# Patient Record
Sex: FEMALE | Race: WHITE | NOT HISPANIC OR LATINO | URBAN - METROPOLITAN AREA
[De-identification: names, ages, dates, MRNs, and addresses within clinical notes are randomized per-mention and may not be internally consistent; named-entity substitution may affect disease eponyms.]

---

## 2023-10-30 ENCOUNTER — LAB OUTSIDE AN ENCOUNTER (OUTPATIENT)
Dept: URBAN - METROPOLITAN AREA CLINIC 118 | Facility: CLINIC | Age: 58
End: 2023-10-30

## 2023-10-30 ENCOUNTER — OFFICE VISIT (OUTPATIENT)
Dept: URBAN - METROPOLITAN AREA CLINIC 118 | Facility: CLINIC | Age: 58
End: 2023-10-30
Payer: COMMERCIAL

## 2023-10-30 VITALS
SYSTOLIC BLOOD PRESSURE: 146 MMHG | DIASTOLIC BLOOD PRESSURE: 77 MMHG | HEIGHT: 62 IN | WEIGHT: 157.8 LBS | BODY MASS INDEX: 29.04 KG/M2 | TEMPERATURE: 97.7 F | HEART RATE: 89 BPM

## 2023-10-30 DIAGNOSIS — R93.2 ABNORMAL LIVER CT: ICD-10-CM

## 2023-10-30 PROBLEM — 15634181000119107: Status: ACTIVE | Noted: 2023-10-30

## 2023-10-30 PROCEDURE — 99204 OFFICE O/P NEW MOD 45 MIN: CPT | Performed by: STUDENT IN AN ORGANIZED HEALTH CARE EDUCATION/TRAINING PROGRAM

## 2023-10-30 NOTE — HPI-TODAY'S VISIT:
The patient is a 57-year-old female with history of cholecystectomy and recent appendectomy who presents for evaluation of abnormal liver imaging.  The patient had an appendectomy on 10/11/2023.  During her hospitalization, the patient reportedly had a CT abdomen and pelvis which revealed a liver abnormality.  She was told to follow-up with a gastroenterologist and that she might need further abdominal imaging.  The patient has been recovering well since discharge from the hospital from her surgery.  Prior to her appendectomy, the patient denies being told that she had any liver issues.  She does note extensive alcohol use for the past 20 years.  She states that she has cut back significantly, and at this point has a few drinks on the weekend, with rarely any drinks during the week.  She says she smokes 1 pack every few days but smoked a pack a day in the past (has been smoking for the past 30 years).  The patient has never had colorectal cancer screening and denies family history of GI or liver cancers or diseases.

## 2023-10-31 LAB
A/G RATIO: 1.4
ALBUMIN: 4.3
ALKALINE PHOSPHATASE: 114
ALT (SGPT): 45
AST (SGOT): 32
BILIRUBIN, TOTAL: 0.4
BUN/CREATININE RATIO: 23
BUN: 24
CALCIUM: 10.1
CARBON DIOXIDE, TOTAL: 31
CHLORIDE: 94
CREATININE: 1.04
EGFR: 62
GLOBULIN, TOTAL: 3
GLUCOSE: 168
POTASSIUM: 4
PROTEIN, TOTAL: 7.3
SODIUM: 137

## 2023-11-13 ENCOUNTER — OFFICE VISIT (OUTPATIENT)
Dept: URBAN - METROPOLITAN AREA CLINIC 117 | Facility: CLINIC | Age: 58
End: 2023-11-13
Payer: COMMERCIAL

## 2023-11-13 DIAGNOSIS — K76.0 FATTY LIVER: ICD-10-CM

## 2023-11-13 DIAGNOSIS — R93.2 ABNORMAL LIVER CT: ICD-10-CM

## 2023-11-13 PROCEDURE — 76700 US EXAM ABDOM COMPLETE: CPT | Performed by: STUDENT IN AN ORGANIZED HEALTH CARE EDUCATION/TRAINING PROGRAM

## 2023-11-19 ENCOUNTER — TELEPHONE ENCOUNTER (OUTPATIENT)
Dept: URBAN - METROPOLITAN AREA CLINIC 118 | Facility: CLINIC | Age: 58
End: 2023-11-19

## 2023-11-27 ENCOUNTER — TELEPHONE ENCOUNTER (OUTPATIENT)
Dept: URBAN - METROPOLITAN AREA CLINIC 118 | Facility: CLINIC | Age: 58
End: 2023-11-27

## 2023-11-30 ENCOUNTER — OFFICE VISIT (OUTPATIENT)
Dept: URBAN - METROPOLITAN AREA CLINIC 118 | Facility: CLINIC | Age: 58
End: 2023-11-30

## 2024-01-04 ENCOUNTER — DASHBOARD ENCOUNTERS (OUTPATIENT)
Age: 59
End: 2024-01-04

## 2024-01-04 ENCOUNTER — OFFICE VISIT (OUTPATIENT)
Dept: URBAN - METROPOLITAN AREA CLINIC 118 | Facility: CLINIC | Age: 59
End: 2024-01-04
Payer: COMMERCIAL

## 2024-01-04 VITALS
HEART RATE: 108 BPM | DIASTOLIC BLOOD PRESSURE: 85 MMHG | BODY MASS INDEX: 29.63 KG/M2 | TEMPERATURE: 98.1 F | SYSTOLIC BLOOD PRESSURE: 138 MMHG | HEIGHT: 62 IN | WEIGHT: 161 LBS

## 2024-01-04 DIAGNOSIS — K86.1 ACUTE ON CHRONIC PANCREATITIS: ICD-10-CM

## 2024-01-04 DIAGNOSIS — K76.0 FATTY LIVER: ICD-10-CM

## 2024-01-04 PROBLEM — 197321007: Status: ACTIVE | Noted: 2024-01-04

## 2024-01-04 PROBLEM — 235494005: Status: ACTIVE | Noted: 2024-01-04

## 2024-01-04 PROCEDURE — 99204 OFFICE O/P NEW MOD 45 MIN: CPT | Performed by: STUDENT IN AN ORGANIZED HEALTH CARE EDUCATION/TRAINING PROGRAM

## 2024-01-04 NOTE — HPI-TODAY'S VISIT:
The patient is a 57-year-old female with history of cholecystectomy and recent appendectomy who presents for evaluation of abnormal liver imaging. The patient was last seen in clinic on 10/30/23.  At her last visit, a CMP and an abdominal ultrasound were ordered.  CMP was significant for creatinine of 1.04, chloride of 94, and ALT of 45.  Abdominal ultrasound revealed an echogenic liver, consistent with fatty infiltration.  Review of her initial CT was significant for acute appendicitis without the finding of perforation (now s/p appendectomy), diffuse fatty infiltration, and chronic pancreatitis (the pancreas was not well-visualized on the recent abdominal ultrasound).  Patient denies having issues with her pancreas in the past.  She denies abdominal pain, nausea, vomiting, issues with bowel movements, or unexplained weight loss recently.

## 2024-05-02 ENCOUNTER — OFFICE VISIT (OUTPATIENT)
Dept: URBAN - METROPOLITAN AREA CLINIC 118 | Facility: CLINIC | Age: 59
End: 2024-05-02